# Patient Record
Sex: MALE | Race: BLACK OR AFRICAN AMERICAN
[De-identification: names, ages, dates, MRNs, and addresses within clinical notes are randomized per-mention and may not be internally consistent; named-entity substitution may affect disease eponyms.]

---
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
## 2019-08-29 ENCOUNTER — HOSPITAL ENCOUNTER (EMERGENCY)
Dept: HOSPITAL 63 - ER | Age: 19
Discharge: HOME | End: 2019-08-29
Payer: SELF-PAY

## 2019-08-29 VITALS — BODY MASS INDEX: 32.08 KG/M2 | HEIGHT: 74 IN | WEIGHT: 250 LBS

## 2019-08-29 VITALS — DIASTOLIC BLOOD PRESSURE: 76 MMHG | SYSTOLIC BLOOD PRESSURE: 158 MMHG
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
## 2019-08-29 DIAGNOSIS — S76.912A: Primary | ICD-10-CM
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
## 2019-08-29 PROCEDURE — 99283 EMERGENCY DEPT VISIT LOW MDM: CPT

## 2019-08-29 PROCEDURE — 96372 THER/PROPH/DIAG INJ SC/IM: CPT

## 2019-08-29 NOTE — PHYS DOC
Past History


Past Medical History:  No Pertinent History


Past Surgical History:  No Surgical History


Smoking:  Non-smoker


Alcohol Use:  None


Drug Use:  None





Adult General


Chief Complaint


Chief Complaint:  GROIN PAIN





HPI


HPI





Patient is a 19-year-old male presenting with upper medial thigh pain he was 

playing basketball somebody faking out he planted his foot wrong and felt a 

sharp pain in his medial thigh area. No pain in the inguinal region no 

testicular pain no abdominal pain did not hit his head no other injury pain is 

sharp worse with movement he is limping.





Review of Systems


Review of Systems


No numbness or tingling





Current Medications


Current Medications





Current Medications








 Medications


  (Trade)  Dose


 Ordered  Sig/[facility]  Start Time


 Stop Time Status Last Admin


Dose Admin


 


 Ketorolac


 Tromethamine


  (Toradol 30mg


 Vial)  30 mg  1X  ONCE  8/29/19 23:00


 8/29/19 22:51 DC 8/29/19 22:27


30 MG











Allergies


Allergies





Allergies








Coded Allergies Type Severity Reaction Last Updated Verified


 


  No Known Drug Allergies    6/5/17 No











Physical Exam


Physical Exam





Constitutional: Well developed, well nourished, no acute distress, non-toxic 

appearance. []


HENT: Normocephalic, atraumatic, bilateral external ears normal, oropharynx mo

ist, no oral exudates, nose normal. []


Eyes: PERRLA, EOMI, conjunctiva normal, no discharge. [] 





Abdomen: Bowel sounds normal, soft, no tenderness, no masses, no pulsatile 

masses. [] No inguinal area tenderness


Skin: Warm, dry, no erythema, no rash. [] 


Back: No tenderness, no CVA tenderness. [] 


Extremities: The tenderness to palpation is located in the upper medial thigh no

 swelling no hematoma distal pedal pulses are intact distal sensation and motor 

function is intact patient is able to walk with just a slight limp in the em

ergency room


Neurologic: Alert and oriented X 3, normal motor function, normal sensory 

function, no focal deficits noted. []


Psychologic: Affect normal, judgement normal, mood normal. []





Current Patient Data


Vital Signs





                                   Vital Signs








  Date Time  Temp Pulse Resp B/P (MAP) Pulse Ox O2 Delivery O2 Flow Rate FiO2


 


8/29/19 21:55 98.2 69 18  97 Room Air  








Lab Results


Distress 


* None


Temperature (Fahrenheit): 


* 98.2 degrees F (97.6-99.5)


Patient Temperature


* 98.2 degrees F (97.5-99.5)


Temperature Source


* Oral


Blood Pressure Systolic


* 158 mm Hg (100-140) H


Blood Pressure Diastolic


* 76 mm Hg ()


Blood Pressure Mean 


* 103 mm Hg


Blood Pressure Location 


* Left Arm


Blood Pressure Source 


* Automatic Cuff


Pulse Rate


* 69 beats per minute (60-90)


Pulse Assessment Method 


* Monitor


Respiratory Rate


* 18 breaths per minute (12-24)


Oxygen Delivery Method


* Room Air


Bedside Pulse Oximetry


* 97 %


Treatment Prior to Arrival





EKG


EKG


[]





Radiology/Procedures


Radiology/Procedures


[]





Course & Med Decision Making


Course & Med Decision Making


Pertinent Labs and Imaging studies reviewed. (See chart for details)





[]Rice therapy as well as NSAIDs as needed return precautions discussed





Dragon Disclaimer


Dragon Disclaimer


This electronic medical record was generated, in whole or in part, using a voice

 recognition dictation system.





Departure


Departure:


Impression:  


   Primary Impression:  


   Muscle strain


Disposition:  01 HOME, SELF-CARE


Condition:  STABLE


Patient Instructions:  Muscle Strain


Scripts


Ibuprofen (IBUPROFEN) 800 Mg Tablet


1 TAB PO TID PRN for PAIN, #15 TAB


   Prov: ARAMIS MOLINA MD         8/29/19











ARAMIS MOLINA MD            Aug 29, 2019 23:07